# Patient Record
(demographics unavailable — no encounter records)

---

## 2025-03-06 NOTE — PHYSICAL EXAM
[Well Developed] : well developed [Well Nourished] : well nourished [No Acute Distress] : no acute distress [Normal Conjunctiva] : normal conjunctiva [No Carotid Bruit] : no carotid bruit [Normal S1, S2] : normal S1, S2 [No Murmur] : no murmur [Clear Lung Fields] : clear lung fields [Good Air Entry] : good air entry [No Respiratory Distress] : no respiratory distress  [Normal Gait] : normal gait [No Edema] : no edema [Moves all extremities] : moves all extremities [No Focal Deficits] : no focal deficits [Alert and Oriented] : alert and oriented [Normal memory] : normal memory

## 2025-03-10 NOTE — REVIEW OF SYSTEMS
[Lower Ext Edema] : lower extremity edema [Fever] : no fever [Chills] : no chills [SOB] : no shortness of breath [Dyspnea on exertion] : not dyspnea during exertion [Chest Discomfort] : no chest discomfort [Palpitations] : no palpitations [Orthopnea] : no orthopnea [Syncope] : no syncope [Cough] : no cough [Nausea] : no nausea [Vomiting] : no vomiting [Diarrhea] : diarrhea [Blood in stool] : no blood in stoo [Dizziness] : no dizziness [Numbness (Hypoesthesia)] : no numbness [Weakness] : no weakness [Speech Disturbance] : no speech disturbance [Easy Bleeding] : no tendency for easy bleeding

## 2025-03-10 NOTE — DISCUSSION/SUMMARY
[EKG obtained to assist in diagnosis and management of assessed problem(s)] : EKG obtained to assist in diagnosis and management of assessed problem(s) [FreeTextEntry1] : 67-year-old M with PMHx of CAD (pt. 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   EKG SR 67BPM, 1st degree, no change  CAD: - Active with on chest pains or dyspnea on exertion - Doubt significant CAD but will get a stress ekg to rule out - Continue with risk factor optimization and statin  HLD - Goal < 70 - Last LDL 61 ( 08/2024 ) while on Atorvastatin 40 alternating with 80mg - Increase to 80mg QD if not at goal  HTN: - BP controlled - Goal < 130/80 - Continue Losartan 25 mg qd and Propranolol 60 mg qd  Aortic dilatation: - Stable - CT chest (03/2022) measured an aortic root of 4.1 cm - Continue with serial echocardiograms - Continue strict BP control - Get Abdominal US to rule out AAA  RTC with stress ekg and echocardiogram I, Dr. Luna, personally performed the evaluation and management (E/M) services for this established patient who presents today with (a) new problem(s)/exacerbation of (an) existing condition(s). That E/M includes conducting the clinically appropriate interval history &/or exam, assessing all new/exacerbated conditions, and establishing a new plan of care. Today, my ACP, Jessy Drummond, was here to observe &/or participate in the visit & follow plan of care established by me.

## 2025-03-10 NOTE — DISCUSSION/SUMMARY
[EKG obtained to assist in diagnosis and management of assessed problem(s)] : EKG obtained to assist in diagnosis and management of assessed problem(s) [FreeTextEntry1] : 67-year-old M with PMHx of CAD (pt. 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   EKG SR 67BPM, 1st degree, no change  CAD: - Active with on chest pains or dyspnea on exertion - Doubt significant CAD but will get a stress ekg to rule out - Continue with risk factor optimization and statin  HLD - Goal < 70 - Last LDL 61 ( 08/2024 ) while on Atorvastatin 40 alternating with 80mg - Increase to 80mg QD if not at goal  HTN: - BP controlled - Goal < 130/80 - Continue Losartan 25 mg qd and Propranolol 60 mg qd  Aortic dilatation: - Stable - CT chest (03/2022) measured an aortic root of 4.1 cm - Continue with serial echocardiograms - Continue strict BP control - Get Abdominal US to rule out AAA  RTC with stress ekg and echocardiogram I, Dr. Luna, personally performed the evaluation and management (E/M) services for this established patient who presents today with (a) new problem(s)/exacerbation of (an) existing condition(s). That E/M includes conducting the clinically appropriate interval history &/or exam, assessing all new/exacerbated conditions, and establishing a new plan of care. Today, my ACP, Jessy Drummond, was here to observe &/or participate in the visit & follow plan of care established by me.

## 2025-03-10 NOTE — HISTORY OF PRESENT ILLNESS
[FreeTextEntry1] : 67-year-old M with PMHx of CAD (pt. had a cardiac arrest from occlusion of the circumflex artery sp MARTIN, cb Brain Injury), Brain Injury  ( with LUE weakness ),  aortic dilatation (4.4 2023), HTN and HLD who presents today for follow-up.   Overall doing well with no acute concerns.   He remains to walk with his wife regularly. He walked 13 blocks and 3 [address] without complications. He has no chest pains or dyspnea on exertion.   Patient denies any palpitations, shortness of breath at rest, dizziness, falls, syncope or new neuro focal deficits.  He has noticing for right leg dragging for about 1 month. Has tripped but no fall. He is following up with Neuro and is planned for physical therapy.  He is noted with trace edema but no orthopnea or PND.   He has no bleeding complications on ASA.  He currently is not fasting.   ======================== LABS ( 2024 )  CR 1.06, GFR 77, , HDL 55, LDL 61, TRI 99, A1c 5.5 Labs ( 2023 ) , HDL 66, LDL 77, A1c 5.5 Labs ( 2022 ) K/Creat: 4.9/1.05, Lipid profile: T, TC: 173, HDL: 59, LDL: 90, A1c: 5.8%, TSH: 0.88  Echo ( 2023 )  1. Left ventricular cavity is normal. Left ventricular wall thickness is normal. Left ventricular systolic function is normal with an ejection fraction of 68 % by Massey's method of disks. There are no regional wall motion abnormalities seen. 2. Normal left ventricular diastolic function. 3. Normal right ventricular cavity size, wall thickness, and systolic function. 4. Aortic root at the sinuses of Valsalva is dilated, measuring 4.20 cm (indexed 1.88 cm/m). Ascending aorta diameter is dilated, measuring 4.00 cm (indexed 1.79 cm/m). 5. No significant valvular disease. 6. No pericardial effusion seen. Echo (2022): normal LVSF with EF 60%, normal RV size and function, moderate aortic root dilatation (AO 4.4 cm), no significant valvular disease  Stress EKG (2023) 5:58 min, 79% of MPHR only. No ischemic changes, no chest pains Stress EKG (2022); pt. exercised according to the MICKY protocol for 5:53 minutes, achieving 7 METs; no ischemic ST EKG changes; normal stress EKG.   CT Chest (2022): IMPRESSION: 1. Minimal wall thickening of the large airways which may suggest mild large airways inflammation however no bronchiectasis, mucous plugging, or pulmonary fibrosis. 2. No evidence of air trapping to suggest underlying small airways inflammation. 3. Mild aneurysmal dilatation of the aortic root measuring 4.1 cm. Mild dilatation of the ascending and proximal descending thoracic aorta without elias aneurysm. 4. Severe coronary artery calcification.

## 2025-04-07 NOTE — HISTORY OF PRESENT ILLNESS
[FreeTextEntry1] : 67-year-old M with PMHx of CAD (pt. had a cardiac arrest from occlusion of the circumflex artery sp MARTIN, cb Brain Injury), Brain Injury  ( with LUE weakness ),  aortic dilatation (4.4 2023), HTN and HLD who presents today for follow-up.   Overall doing well with no acute concerns.   He remains to walk with his wife regularly. He walked 13 blocks and 3 [address] without complications. He has no chest pains or dyspnea on exertion.   Patient denies any palpitations, shortness of breath at rest, dizziness, falls, syncope or new neuro focal deficits.  He has noticing for right leg dragging for about 1 month. Has tripped but no fall. He is following up with Neuro and is planned for physical therapy.  He is noted with trace edema but no orthopnea or PND.   He has no bleeding complications on ASa  ======================== LABS ( 2024 )  CR 1.06, GFR 77, , HDL 55, LDL 61, TRI 99, A1c 5.5 Labs ( 2023 ) , HDL 66, LDL 77, A1c 5.5 Labs ( 2022 ) K/Creat: 4.9/1.05, Lipid profile: T, TC: 173, HDL: 59, LDL: 90, A1c: 5.8%, TSH: 0.88  Echo ( 2023 )  1. Left ventricular cavity is normal. Left ventricular wall thickness is normal. Left ventricular systolic function is normal with an ejection fraction of 68 % by Massey's method of disks. There are no regional wall motion abnormalities seen. 2. Normal left ventricular diastolic function. 3. Normal right ventricular cavity size, wall thickness, and systolic function. 4. Aortic root at the sinuses of Valsalva is dilated, measuring 4.20 cm (indexed 1.88 cm/m). Ascending aorta diameter is dilated, measuring 4.00 cm (indexed 1.79 cm/m). 5. No significant valvular disease. 6. No pericardial effusion seen. Echo (2022): normal LVSF with EF 60%, normal RV size and function, moderate aortic root dilatation (AO 4.4 cm), no significant valvular disease  Stress EKG (2023) 5:58 min, 79% of MPHR only. No ischemic changes, no chest pains Stress EKG (2022); pt. exercised according to the MICKY protocol for 5:53 minutes, achieving 7 METs; no ischemic ST EKG changes; normal stress EKG.   CT Chest (2022): IMPRESSION: 1. Minimal wall thickening of the large airways which may suggest mild large airways inflammation however no bronchiectasis, mucous plugging, or pulmonary fibrosis. 2. No evidence of air trapping to suggest underlying small airways inflammation. 3. Mild aneurysmal dilatation of the aortic root measuring 4.1 cm. Mild dilatation of the ascending and proximal descending thoracic aorta without elias aneurysm. 4. Severe coronary artery calcification.

## 2025-04-07 NOTE — DISCUSSION/SUMMARY
[FreeTextEntry1] : 67-year-old M with PMHx of CAD (pt. 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   EKG SR 67BPM, 1st degree, no change  CAD:  - negative ett for ischemia or arrythmia - Continue with risk factor optimization and statin  HLD - Goal < 70 - Last LDL  HTN: - BP controlled - Goal < 130/80 - Continue Losartan 25 mg qd and Propranolol 60 mg qd  Aortic dilatation: - Stable review echo - CT chest (03/2022) measured an aortic root of 4.1 cm - Continue with serial echocardiograms - Continue strict BP control - Get Abdominal US to rule out AAa